# Patient Record
Sex: FEMALE | Race: WHITE | HISPANIC OR LATINO | Employment: OTHER | URBAN - METROPOLITAN AREA
[De-identification: names, ages, dates, MRNs, and addresses within clinical notes are randomized per-mention and may not be internally consistent; named-entity substitution may affect disease eponyms.]

---

## 2019-11-25 ENCOUNTER — TRANSCRIBE ORDERS (OUTPATIENT)
Dept: ADMINISTRATIVE | Facility: HOSPITAL | Age: 44
End: 2019-11-25

## 2019-11-25 DIAGNOSIS — R06.81 APNEA: ICD-10-CM

## 2019-11-25 DIAGNOSIS — R06.83 SNORING: Primary | ICD-10-CM

## 2019-11-27 ENCOUNTER — OFFICE VISIT (OUTPATIENT)
Dept: SLEEP CENTER | Facility: CLINIC | Age: 44
End: 2019-11-27
Payer: COMMERCIAL

## 2019-11-27 VITALS
BODY MASS INDEX: 34.6 KG/M2 | HEART RATE: 74 BPM | DIASTOLIC BLOOD PRESSURE: 50 MMHG | WEIGHT: 188 LBS | SYSTOLIC BLOOD PRESSURE: 114 MMHG | HEIGHT: 62 IN

## 2019-11-27 DIAGNOSIS — E66.9 OBESITY (BMI 30-39.9): ICD-10-CM

## 2019-11-27 DIAGNOSIS — R06.81 APNEA: ICD-10-CM

## 2019-11-27 DIAGNOSIS — E11.42 DIABETIC PERIPHERAL NEUROPATHY (HCC): ICD-10-CM

## 2019-11-27 DIAGNOSIS — R06.83 SNORING: Primary | ICD-10-CM

## 2019-11-27 DIAGNOSIS — R00.2 PALPITATIONS: ICD-10-CM

## 2019-11-27 DIAGNOSIS — Z72.0 TOBACCO ABUSE: ICD-10-CM

## 2019-11-27 DIAGNOSIS — G25.81 RESTLESS LEG SYNDROME: ICD-10-CM

## 2019-11-27 DIAGNOSIS — J31.0 CHRONIC RHINITIS: ICD-10-CM

## 2019-11-27 DIAGNOSIS — I10 ESSENTIAL HYPERTENSION: ICD-10-CM

## 2019-11-27 DIAGNOSIS — F41.9 ANXIETY: ICD-10-CM

## 2019-11-27 DIAGNOSIS — G47.09 OTHER INSOMNIA: ICD-10-CM

## 2019-11-27 DIAGNOSIS — G47.19 EXCESSIVE DAYTIME SLEEPINESS: ICD-10-CM

## 2019-11-27 DIAGNOSIS — F41.0 PANIC ATTACKS: ICD-10-CM

## 2019-11-27 PROCEDURE — 99204 OFFICE O/P NEW MOD 45 MIN: CPT | Performed by: INTERNAL MEDICINE

## 2019-11-27 RX ORDER — GABAPENTIN 400 MG/1
CAPSULE ORAL
COMMUNITY
Start: 2017-11-15

## 2019-11-27 RX ORDER — TRAZODONE HYDROCHLORIDE 50 MG/1
1 TABLET ORAL DAILY PRN
COMMUNITY

## 2019-11-27 RX ORDER — LISINOPRIL 5 MG/1
TABLET ORAL
COMMUNITY
Start: 2017-11-27

## 2019-11-27 RX ORDER — ROSUVASTATIN CALCIUM 20 MG/1
20 TABLET, COATED ORAL
COMMUNITY
Start: 2016-12-21

## 2019-11-27 RX ORDER — NITROGLYCERIN 0.4 MG/1
TABLET SUBLINGUAL
COMMUNITY
Start: 2018-03-20

## 2019-11-27 RX ORDER — AMLODIPINE BESYLATE 5 MG/1
5 TABLET ORAL
COMMUNITY
Start: 2019-02-25

## 2019-11-27 RX ORDER — CHOLECALCIFEROL (VITAMIN D3) 1250 MCG
50000 CAPSULE ORAL
COMMUNITY
Start: 2019-05-02

## 2019-11-27 RX ORDER — PAROXETINE HYDROCHLORIDE 20 MG/1
20 TABLET, FILM COATED ORAL
COMMUNITY
Start: 2019-05-28

## 2019-11-27 RX ORDER — LORATADINE 10 MG/1
10 TABLET ORAL
COMMUNITY
Start: 2017-06-01

## 2019-11-27 NOTE — PATIENT INSTRUCTIONS
What is CHRISTY? Obstructive sleep apnea is a common and serious sleep disorder that causes you to stop breathing during sleep  The airway repeatedly becomes blocked, limiting the amount of air that reaches your lungs  When this happens, you may snore loudly or making choking noises as you try to breathe  Your brain and body becomes oxygen deprived and you may wake up  This may happen a few times a night, or in more severe cases, several hundred times a night  Sleep apnea can make you wake up in the morning feeling tired or unrefreshed even though you have had a full night of sleep  During the day, you may feel fatigued, have difficulty concentrating or you may even unintentionally fall asleep  This is because your body is waking up numerous times throughout the night, even though you might not be conscious of each awakening  The lack of oxygen your body receives can have negative long-term consequences for your health  This includes:  High blood pressure  Heart disease  Irregular heart rhythms  Stroke  Pre-diabetes and diabetes  Depression    Testing  An objective evaluation of your sleep may be needed before your board certified sleep physician can make a diagnosis  Options include:   In-lab overnight sleep study  This type of sleep study requires you to stay overnight at a sleep center, in a bed that may resemble a hotel room  You will sleep with sensors hooked up to various parts of your body  These sensors record your brain waves, heartbeat, breathing and movement  An overnight sleep study also provides your doctor with the most complete information about your sleep  Learn more about an overnight sleep study      Home sleep apnea test  Some patients with high risk factors for obstructive sleep apnea and no other medical disorders may be candidates for a home sleep apnea test  The testing equipment differs in that it is less complicated than what is used in an overnight sleep study   As such, does not give all the data an in-lab will and if negative, may not mean you do not have the problem  Treatment for sleep apnea  includes using a continuous positive airway pressure (CPAP) machine to keep your airway open during sleep  A mask is placed over your nose and mouth, or just your nose  The mask is hooked to the CPAP machine that blows a gentle stream of air into the mask when you breathe  This helps keep your airway open so you can breathe more regularly  Extra oxygen may be given to you through the machine  You may be given a mouth device  It looks like a mouth guard or dental retainer and stops your tongue and mouth tissues from blocking your throat while you sleep  Surgery may be needed to remove extra tissues that block your mouth, throat, or nose  Manage sleep apnea:   Do not smoke  Nicotine and other chemicals in cigarettes and cigars can cause lung damage  Ask your healthcare provider for information if you currently smoke and need help to quit  E-cigarettes or smokeless tobacco still contain nicotine  Talk to your healthcare provider before you use these products  Do not drink alcohol or take sedative medicine before you go to sleep  Alcohol and sedatives can relax the muscles and tissues around your throat  This can block the airflow to your lungs  Maintain a healthy weight  Excess tissue around your throat may restrict your breathing  Ask your healthcare provider for information if you need to lose weight  Sleep on your side or use pillows designed to prevent sleep apnea  This prevents your tongue or other tissues from blocking your throat  You can also raise the head of your bed  Driving Safety  Refrain from driving when drowsy  Follow up with your healthcare provider as directed:  Write down your questions so you remember to ask them during your visits  Go to [employer] website for more information: Sleepeducation  org     What is CHRISTY?    Obstructive sleep apnea is a common and serious sleep disorder that causes you to stop breathing during sleep  The airway repeatedly becomes blocked, limiting the amount of air that reaches your lungs  When this happens, you may snore loudly or making choking noises as you try to breathe  Your brain and body becomes oxygen deprived and you may wake up  This may happen a few times a night, or in more severe cases, several hundred times a night  Sleep apnea can make you wake up in the morning feeling tired or unrefreshed even though you have had a full night of sleep  During the day, you may feel fatigued, have difficulty concentrating or you may even unintentionally fall asleep  This is because your body is waking up numerous times throughout the night, even though you might not be conscious of each awakening  The lack of oxygen your body receives can have negative long-term consequences for your health  This includes:  High blood pressure  Heart disease  Irregular heart rhythms  Stroke  Pre-diabetes and diabetes  Depression    Testing  An objective evaluation of your sleep may be needed before your board certified sleep physician can make a diagnosis  Options include:   In-lab overnight sleep study  This type of sleep study requires you to stay overnight at a sleep center, in a bed that may resemble a hotel room  You will sleep with sensors hooked up to various parts of your body  These sensors record your brain waves, heartbeat, breathing and movement  An overnight sleep study also provides your doctor with the most complete information about your sleep  Learn more about an overnight sleep study      Home sleep apnea test  Some patients with high risk factors for obstructive sleep apnea and no other medical disorders may be candidates for a home sleep apnea test  The testing equipment differs in that it is less complicated than what is used in an overnight sleep study   As such, does not give all the data an in-lab will and if negative, may not mean you do not have the problem  Treatment for sleep apnea  includes using a continuous positive airway pressure (CPAP) machine to keep your airway open during sleep  A mask is placed over your nose and mouth, or just your nose  The mask is hooked to the CPAP machine that blows a gentle stream of air into the mask when you breathe  This helps keep your airway open so you can breathe more regularly  Extra oxygen may be given to you through the machine  You may be given a mouth device  It looks like a mouth guard or dental retainer and stops your tongue and mouth tissues from blocking your throat while you sleep  Surgery may be needed to remove extra tissues that block your mouth, throat, or nose  Manage sleep apnea:   Do not smoke  Nicotine and other chemicals in cigarettes and cigars can cause lung damage  Ask your healthcare provider for information if you currently smoke and need help to quit  E-cigarettes or smokeless tobacco still contain nicotine  Talk to your healthcare provider before you use these products  Do not drink alcohol or take sedative medicine before you go to sleep  Alcohol and sedatives can relax the muscles and tissues around your throat  This can block the airflow to your lungs  Maintain a healthy weight  Excess tissue around your throat may restrict your breathing  Ask your healthcare provider for information if you need to lose weight  Sleep on your side or use pillows designed to prevent sleep apnea  This prevents your tongue or other tissues from blocking your throat  You can also raise the head of your bed  Driving Safety  Refrain from driving when drowsy  Follow up with your healthcare provider as directed:  Write down your questions so you remember to ask them during your visits  Go to AAS website for more information: Sleepeducation  org           Nursing Support:  When: Monday through Friday 7A-5PM except holidays  Where: Our direct line is [phone]      If you are having a true emergency please call 911  In the event that the line is busy or it is after hours please leave a voice message and we will return your call  Please speak clearly, leaving your full name, birth date, best number to reach you and the reason for your call  Medication refills: We will need the name of the medication, the dosage, the ordering provider, whether you get a 30 or 90 day refill, and the pharmacy name and address  Medications will be ordered by the provider only  Nurses cannot call in prescriptions  Please allow 7 days for medication refills  Physician requested updates: If your provider requested that you call with an update after starting medication, please be ready to provide us the medication and dosage, what time you take your medication, the time you attempt to fall asleep, time you fall asleep, when you wake up, and what time you get out of bed  Sleep Study Results: We will contact you with sleep study results and/or next steps after the physician has reviewed your testing

## 2019-11-27 NOTE — PROGRESS NOTES
Review of Systems      Genitourinary need to urinate more than twice a night and hot flashes at night   Cardiology palpitations/fluttering feeling in the chest and ankle/leg swelling   Gastrointestinal none   Neurology frequent headaches, awaken with headache, forgetfulness, poor concentration or confusion,  and difficulty with memory   Constitutional fatigue and weight change   Integumentary none   Psychiatry anxiety and depression   Musculoskeletal legs twitching/jerking   Pulmonary snoring and difficulty breathing when lying flat    ENT throat clearing   Endocrine none   Hematological none

## 2019-11-27 NOTE — PROGRESS NOTES
Consultation - Sleep Center   Destiny Cheung  53 y o  female  :1975  IFK:[id number]    Physician Requesting Consult: Jonathan Recinos DO     Reason for Consult : At your kind request I saw this patient for initial sleep evaluation today  The patient is here to evaluate for suspected Obstructive Sleep Apnea  PFSH, Problem List, Medications & Allergies were reviewed in EMR  She  has no past medical history on file  She has a current medication list which includes the following prescription(s): acetaminophen, amlodipine, vitamin d3, gabapentin, insulin aspart, insulin glargine, lisinopril, loratadine, nitroglycerin, paroxetine, patiromer sorbitex calcium, rosuvastatin, and trazodone  HPI:  She presents with a complaint of loud snoring of at least 2 months duration and family have witnessed apneas  On occasion, she has a woken herself with choking or gasping  Episodes may be associated with palpitations or excessive sweating  Other Complaints:  Fatigue irrespective of sleep  Restless Leg Syndrome: has typical symptoms occurring around once a week and can delay sleep  Symptoms are somewhat improved since she started gabapentin for peripheral neuropathy  Parasomnia: no features reported    Sleep Routine: Typical Bedtime: 9:30 p m  Gets OOB:  6:00 a m  TIB:8 5 hrs  Sleep latency:> 60 minutes but denied reason still or clock watching  She uses trazodone as a sleep aid  Sleep Interruptions:2-3/nite and is usually able to fall back asleep  Awakens: spontaneously  Upon awakening: never feels rested  She estimates getting 4-6 hrs sleep  She has Excessive Daytime Sleepiness struggles to stay awake and naps when she has the opportunity  [place] Sleepiness Scale rated at Total score: 15 /24  Habits: reports that she has been smoking  She uses smokeless tobacco  , reports that she drank alcohol  ,  reports that she does not use drugs  ,Caffeine use:limited , Exercise routine: none         Family History: Negative for sleep disturbance  ROS: reviewed & as attached  Significant for approximately 30 lb weight gain in the past 1 year  She has constant nasal congestion for which she has to use Afrin  She is on Paxil for anxiety  And panic attacks  EXAM:  /50   Pulse 74   Ht 5' 2" (1 575 m)   Wt 85 3 kg (188 lb)   BMI 34 39 kg/m²    General: Well groomed female, well appearing, in no apparent distress  Psychiatric: Alert and orientated; Cooperative; Speech:clear and coherent; Normal mood, affect & thought   HEENT:  Craniofacial anatomy: prognathia Sinuses: non- tender  TMJ: Normal    Eyes: EOM's intact;  conjunctiva/corneas clear   Ears: Externallyappear normal     Nasal Airway: partially obstructed Septum:intact; Mucosa:  Hyperemic; Turbinates:  Hypertrophy; Rhinorrhea: None   Mouth: Lips: normal posture; Dentition: normal   Mucosa:moist  ; Hard Palate:normal    Oropharryx: crowded and AP narrowing Tongue: Mallampati:Class II and MobileSoft Palate:  redundant  Tonsils: no hypertrophy  Neck:  Neck Circumference: 13 "; Supple; no abnormal masses; Thyroid:normal  Trachea:central     Lymph: No Cervical or Submandibular Lymhadenopathy  Heart: S1,S2 normal; RRR; no gallop; nomurmurs   Lungs: Respiratory Effort:normal  Air entry good bilaterally  No wheezes  No rales  Abdomen: Obese, Soft & non-tender    Extremities: No pedal edema  No clubbing or cyanosis  Skin: warm and dry; Color& Hydration good; no facial rashes or lesions   Neurological: CNII-XII intact; Motor normal; Sensation normal  Musculoskeletal: Muscle bulk, tone and power WNL Gait:normal        IMPRESSION: Primary, Secondary Sleep Diagnoses (to Medical or Psych conditions) & Comorbidities   1  Snoring  Ambulatory referral to Sleep Medicine    Split Study   2  Apnea  Ambulatory referral to Sleep Medicine    Split Study   3  Other insomnia     4  Restless leg syndrome     5  Excessive daytime sleepiness  Split Study   6   Essential hypertension     7  Chronic rhinitis     8  Tobacco abuse     9  Anxiety     10  Panic attacks     11  Palpitations     12  Diabetic peripheral neuropathy ([facility] [address] )     13  Obesity (BMI 30-39  9)        PLAN:   1  Comprehensive counseling was provided on pathophysiology, diagnostic strategies & treatment options; effects on symptoms and comorbidities; risks of inadequate therapy; costs and insurance aspects  2  I advised on weight reduction, avoiding sleeping supine, using alcohol or sedating medications close to bed time and on safe driving practices  3  Cognitive behavioral therapy was initiated with advise on Sleep Hygiene and behavioral techniques to manage Insomnia  4  Nocturnal polysomnography is indicated and a diagnostic study will be scheduled  5  Patient is willing to try Positive airway pressure therapy and will be scheduled for a titration study if indicated  6  Follow-up will be scheduled after the studies to review results, further details of treatment options and to initiate/adjust therapy  Thank you for allowing me to participate in the care of this patient  I will keep you apprised of developments      Sincerely,     Authenticated electronically by Chavez Bishop MD   on 28/27/08   Board Certified Specialist

## 2019-12-13 ENCOUNTER — HOSPITAL ENCOUNTER (OUTPATIENT)
Dept: SLEEP CENTER | Facility: CLINIC | Age: 44
Discharge: HOME | End: 2019-12-13
Payer: COMMERCIAL

## 2019-12-13 DIAGNOSIS — G47.33 OSA (OBSTRUCTIVE SLEEP APNEA): ICD-10-CM

## 2019-12-13 DIAGNOSIS — G47.19 EXCESSIVE DAYTIME SLEEPINESS: ICD-10-CM

## 2019-12-13 DIAGNOSIS — R06.81 APNEA: ICD-10-CM

## 2019-12-13 DIAGNOSIS — R06.83 SNORING: ICD-10-CM

## 2019-12-13 PROCEDURE — 95810 POLYSOM 6/> YRS 4/> PARAM: CPT

## 2019-12-14 ENCOUNTER — TRANSCRIBE ORDERS (OUTPATIENT)
Dept: SLEEP CENTER | Facility: CLINIC | Age: 44
End: 2019-12-14

## 2019-12-14 DIAGNOSIS — G47.33 OSA (OBSTRUCTIVE SLEEP APNEA): Primary | ICD-10-CM

## 2019-12-14 NOTE — PROGRESS NOTES
Sleep Study Documentation    Pre-Sleep Study       Sleep testing procedure explained to patient:YES    Patient napped prior to study:NO    Caffeine:Dayshift worker after 12PM   Caffeine use:NO    Alcohol:Dayshift workers after 5PM: Alcohol use:NO    Typical day for patient:YES       Study Documentation    Sleep Study Indications: Snore, witnessed apnea, Choking/Gasping during sleep, EDS, Unrefreshing sleep, Morning headaches    Sleep Study: Diagnostic   Snore:Mild  Supplemental O2: no    O2 flow rate (L/min) range NA  O2 flow rate (L/min) final NA  Minimum SaO2 82  Baseline SaO2 95        Mode of Therapy: Na    EKG abnormalities: no     EEG abnormalities: no    Sleep Study Recorded < 2 hours: N/A    Sleep Study Recorded > 2 hours but incomplete study: N/A    Sleep Study Recorded 6 hours but no sleep obtained: NO    Patient classification: disabled       Post-Sleep Study    Medication used at bedtime or during sleep study:NO  Patient forgot to take evening Meds    Patient reports time it took to fall asleep:greater than 60 minutes    Patient reports waking up during study:3 or more times  Patient reports returning to sleep in greater than 30 minutes  Patient reports sleeping 2 to 4 hours without dreaming  Patient reports sleep during study:worse than usual    Patient rated sleepiness: Very sleepy or tired    PAP treatment:no    Patient did not meet split night criteria due to prolonged sleep onset and lack of clinically significant AHI  Patient c/o discomfort from wires and anxiety

## 2019-12-16 DIAGNOSIS — G47.34 SLEEP RELATED HYPOXIA: Primary | ICD-10-CM

## 2019-12-17 ENCOUNTER — TELEPHONE (OUTPATIENT)
Dept: SLEEP CENTER | Facility: CLINIC | Age: 44
End: 2019-12-17

## 2019-12-17 NOTE — TELEPHONE ENCOUNTER
Discussed study results- scheduled for follow-up 1/10 in [facility]- address provided  Advised her Dr Nelly Wood is recommending that she see a pulmonologist  Oxygen order & sleep study faxed to [facility]

## 2020-01-10 ENCOUNTER — OFFICE VISIT (OUTPATIENT)
Dept: SLEEP CENTER | Facility: CLINIC | Age: 45
End: 2020-01-10
Payer: COMMERCIAL

## 2020-01-10 VITALS
DIASTOLIC BLOOD PRESSURE: 62 MMHG | BODY MASS INDEX: 33.31 KG/M2 | HEART RATE: 70 BPM | HEIGHT: 62 IN | WEIGHT: 181 LBS | SYSTOLIC BLOOD PRESSURE: 118 MMHG

## 2020-01-10 DIAGNOSIS — R06.83 SNORING: ICD-10-CM

## 2020-01-10 DIAGNOSIS — G47.34 SLEEP RELATED HYPOXIA: Primary | ICD-10-CM

## 2020-01-10 DIAGNOSIS — G25.81 RESTLESS LEG SYNDROME: ICD-10-CM

## 2020-01-10 DIAGNOSIS — G47.09 OTHER INSOMNIA: ICD-10-CM

## 2020-01-10 DIAGNOSIS — Z72.0 TOBACCO ABUSE: ICD-10-CM

## 2020-01-10 DIAGNOSIS — F41.9 ANXIETY: ICD-10-CM

## 2020-01-10 DIAGNOSIS — G47.19 EXCESSIVE DAYTIME SLEEPINESS: ICD-10-CM

## 2020-01-10 DIAGNOSIS — J31.0 CHRONIC RHINITIS: ICD-10-CM

## 2020-01-10 PROCEDURE — 99214 OFFICE O/P EST MOD 30 MIN: CPT | Performed by: INTERNAL MEDICINE

## 2020-01-10 NOTE — PROGRESS NOTES
Follow-up Note - Sleep Center   Sukhdeep Flynn  53 y o  female  :1975  AJV:[id number]    I saw Syed Huntley in sleep clinic today for her sleep complaints & comorbidities  Patient recently had a diagnostic sleep study and is here to review results / treatment options  The study demonstrated snoring with features of upper airway resistance, but no evidence for  obstructive sleep apnea: AHI 2 4 /hour and higher during stage REM  Frequent snoring of mild to moderate intensity was noted    Minimum oxygen saturation 82 %  and 87 7% of total sleep time was spent with saturations less than 90%  There was severe periodic limb movements of sleep for an index of 49 per hour causing arousals 3 times an hour  PFSH, Problem List, Medications & Allergies were reviewed in EMR  Interval changes: none reported  She  has no past medical history on file  She has a current medication list which includes the following prescription(s): acetaminophen, amlodipine, vitamin d3, gabapentin, insulin aspart, insulin glargine, liraglutide, lisinopril, loratadine, nitroglycerin, paroxetine, patiromer sorbitex calcium, rosuvastatin, and trazodone  ROS: reviewed see attached  HPI:  Supplemental oxygen was initiated and she feels sleeping longer since initiating use  She has restless leg symptoms and jerking movements that awaken her from sleep  Sleep Routine:  She estimates getting 6-8 hours sleep out of 8-10 hours in bed  She attributes to anxiety and has racing thoughts  She is on Paxil that has helped somewhat but felt she did better with sleep when on trazodone  She has excessive drowsiness and naps for 2-3 hours during the day  She rated herself at Total score: 15 /24 on the [place] sleepiness scale  Habits:  reports that she has been smoking  She uses smokeless tobacco  She reports that she drank alcohol  She reports that she does not use drugs  She has reduced her smoking    She is unable to exercise because of back and lower extremity pain  EXAM: /62   Pulse 70   Ht 5' 2" (1 575 m)   Wt 82 1 kg (181 lb)   BMI 33 11 kg/m²     Patient is well groomed; well appearing  Skin/Extrem: warm & dry; col & hydration normal; no edema  Psych: cooperativeand in no distress  Anxious  CNS: Alert, orientated, pressured speech  H&N: EOMI; NC/AT:no facial pressure marks, no rashes  Neck Circumference: 13 cm  ENMT Mucosa appearsnormal Nasal airway:patent  Oral airway:  crowded  Resp:effort is normal CVS: RRR ABD: truncal obesity MSK:Gait normal     IMPRESSION: Primary Sleep/Secondary(to Medical or Psych conditions) & comorbidities   1  Sleep related hypoxia  Pulse oximetry overnight   2  Snoring     3  Other insomnia     4  Restless leg syndrome     5  Excessive daytime sleepiness     6  Anxiety     7  Tobacco abuse     8  Chronic rhinitis         PLAN:    1  I reviewed results of the Sleep studies with the patient  2  With respect to above conditions, I counseled on pathophysiology, diagnosis, treatment options, risks and benefits; inter-relationship and effects on symptoms and comorbidities; risks of no treatment; costs and insurance aspects  3  She will continue with supplemental oxygen at 2 liters/minute during sleep  4  Cognitive behavioral therapy was initiated, Sleep Hygiene and behavioral techniques to manage Insomnia were discussed  Specifically avoiding daytime naps, limiting time in bed to 7-1/2 hours, avoiding watching TV in bed and on relaxation techniques  5  She may benefit from further adjustment of her Paxil or adding trazodone q h s  6  If symptoms persist in spite of the above strategies, a repeat diagnostic study once sleep has consolidated would be warranted  7  She was encouraged to persist with her efforts at smoking cessation and on weight reduction  Nocturnal pulse oximetry (on O2 via nasal cannula) to be undertaken prior to her next follow-up       8  Need for compliance with therapy and weight reduction were emphasized  9  Follow-up to be scheduled in 6 months to monitor compliance, progress and to adjust therapy  Thank you for allowing me to participate in the care of this patient  I will keep you apprised of developments      Sincerely,    Authenticated electronically by Jennifer Soto MD on 44/52/14   Board Certified Specialist

## 2020-01-10 NOTE — PATIENT INSTRUCTIONS

## 2020-01-10 NOTE — PROGRESS NOTES
Review of Systems      Genitourinary need to urinate more than twice a night   Cardiology palpitations/fluttering feeling in the chest and ankle/leg swelling   Gastrointestinal none   Neurology muscle weakness, forgetfulness, poor concentration or confusion,  and difficulty with memory   Constitutional fatigue   Integumentary none   Psychiatry anxiety, aggressiveness or irritability and mood change   Musculoskeletal back pain, legs twitching/jerking and sciatica   Pulmonary snoring   ENT throat clearing   Endocrine frequent urination   Hematological none

## 2020-01-13 ENCOUNTER — TELEPHONE (OUTPATIENT)
Dept: SLEEP CENTER | Facility: CLINIC | Age: 45
End: 2020-01-13

## 2020-07-18 ENCOUNTER — HOSPITAL ENCOUNTER (EMERGENCY)
Facility: HOSPITAL | Age: 45
Discharge: HOME | End: 2020-07-18
Attending: EMERGENCY MEDICINE | Admitting: EMERGENCY MEDICINE
Payer: COMMERCIAL

## 2020-07-18 ENCOUNTER — APPOINTMENT (EMERGENCY)
Dept: CT IMAGING | Facility: HOSPITAL | Age: 45
End: 2020-07-18
Payer: COMMERCIAL

## 2020-07-18 VITALS
SYSTOLIC BLOOD PRESSURE: 152 MMHG | HEART RATE: 94 BPM | WEIGHT: 172.62 LBS | OXYGEN SATURATION: 97 % | DIASTOLIC BLOOD PRESSURE: 70 MMHG | TEMPERATURE: 98.6 F | RESPIRATION RATE: 18 BRPM | BODY MASS INDEX: 31.57 KG/M2

## 2020-07-18 DIAGNOSIS — R10.9 ABDOMINAL CRAMPS: ICD-10-CM

## 2020-07-18 DIAGNOSIS — R19.7 DIARRHEA: Primary | ICD-10-CM

## 2020-07-18 LAB
ALBUMIN SERPL BCP-MCNC: 3.1 G/DL (ref 3.5–5)
ALP SERPL-CCNC: 155 U/L (ref 46–116)
ALT SERPL W P-5'-P-CCNC: 17 U/L (ref 12–78)
ANION GAP SERPL CALCULATED.3IONS-SCNC: 8 MMOL/L (ref 4–13)
APTT PPP: 31 SECONDS (ref 23–37)
AST SERPL W P-5'-P-CCNC: 18 U/L (ref 5–45)
BASOPHILS # BLD AUTO: 0.06 THOUSANDS/ΜL (ref 0–0.1)
BASOPHILS NFR BLD AUTO: 1 % (ref 0–1)
BILIRUB SERPL-MCNC: 0.18 MG/DL (ref 0.2–1)
BUN SERPL-MCNC: 31 MG/DL (ref 5–25)
CALCIUM SERPL-MCNC: 8.5 MG/DL (ref 8.3–10.1)
CHLORIDE SERPL-SCNC: 102 MMOL/L (ref 100–108)
CO2 SERPL-SCNC: 25 MMOL/L (ref 21–32)
CREAT SERPL-MCNC: 1.89 MG/DL (ref 0.6–1.3)
EOSINOPHIL # BLD AUTO: 0.23 THOUSAND/ΜL (ref 0–0.61)
EOSINOPHIL NFR BLD AUTO: 2 % (ref 0–6)
ERYTHROCYTE [DISTWIDTH] IN BLOOD BY AUTOMATED COUNT: 12.4 % (ref 11.6–15.1)
GFR SERPL CREATININE-BSD FRML MDRD: 31 ML/MIN/1.73SQ M
GLUCOSE SERPL-MCNC: 149 MG/DL (ref 65–140)
HCT VFR BLD AUTO: 33.4 % (ref 34.8–46.1)
HGB BLD-MCNC: 11.1 G/DL (ref 11.5–15.4)
IMM GRANULOCYTES # BLD AUTO: 0.06 THOUSAND/UL (ref 0–0.2)
IMM GRANULOCYTES NFR BLD AUTO: 1 % (ref 0–2)
INR PPP: 1.04 (ref 0.84–1.19)
LIPASE SERPL-CCNC: 456 U/L (ref 73–393)
LYMPHOCYTES # BLD AUTO: 2.77 THOUSANDS/ΜL (ref 0.6–4.47)
LYMPHOCYTES NFR BLD AUTO: 25 % (ref 14–44)
MCH RBC QN AUTO: 29.8 PG (ref 26.8–34.3)
MCHC RBC AUTO-ENTMCNC: 33.2 G/DL (ref 31.4–37.4)
MCV RBC AUTO: 90 FL (ref 82–98)
MONOCYTES # BLD AUTO: 0.53 THOUSAND/ΜL (ref 0.17–1.22)
MONOCYTES NFR BLD AUTO: 5 % (ref 4–12)
NEUTROPHILS # BLD AUTO: 7.48 THOUSANDS/ΜL (ref 1.85–7.62)
NEUTS SEG NFR BLD AUTO: 66 % (ref 43–75)
NRBC BLD AUTO-RTO: 0 /100 WBCS
PLATELET # BLD AUTO: 381 THOUSANDS/UL (ref 149–390)
PMV BLD AUTO: 9.5 FL (ref 8.9–12.7)
POTASSIUM SERPL-SCNC: 4.7 MMOL/L (ref 3.5–5.3)
PROT SERPL-MCNC: 6.5 G/DL (ref 6.4–8.2)
PROTHROMBIN TIME: 13 SECONDS (ref 11.6–14.5)
RBC # BLD AUTO: 3.73 MILLION/UL (ref 3.81–5.12)
SODIUM SERPL-SCNC: 135 MMOL/L (ref 136–145)
TROPONIN I SERPL-MCNC: 0.02 NG/ML
WBC # BLD AUTO: 11.13 THOUSAND/UL (ref 4.31–10.16)

## 2020-07-18 PROCEDURE — 83690 ASSAY OF LIPASE: CPT | Performed by: EMERGENCY MEDICINE

## 2020-07-18 PROCEDURE — 80053 COMPREHEN METABOLIC PANEL: CPT | Performed by: EMERGENCY MEDICINE

## 2020-07-18 PROCEDURE — 84484 ASSAY OF TROPONIN QUANT: CPT | Performed by: EMERGENCY MEDICINE

## 2020-07-18 PROCEDURE — 96360 HYDRATION IV INFUSION INIT: CPT

## 2020-07-18 PROCEDURE — 85730 THROMBOPLASTIN TIME PARTIAL: CPT | Performed by: EMERGENCY MEDICINE

## 2020-07-18 PROCEDURE — 85025 COMPLETE CBC W/AUTO DIFF WBC: CPT | Performed by: EMERGENCY MEDICINE

## 2020-07-18 PROCEDURE — 85610 PROTHROMBIN TIME: CPT | Performed by: EMERGENCY MEDICINE

## 2020-07-18 PROCEDURE — 96361 HYDRATE IV INFUSION ADD-ON: CPT

## 2020-07-18 PROCEDURE — 99284 EMERGENCY DEPT VISIT MOD MDM: CPT | Performed by: EMERGENCY MEDICINE

## 2020-07-18 PROCEDURE — 74177 CT ABD & PELVIS W/CONTRAST: CPT

## 2020-07-18 PROCEDURE — 93005 ELECTROCARDIOGRAM TRACING: CPT

## 2020-07-18 PROCEDURE — 99285 EMERGENCY DEPT VISIT HI MDM: CPT

## 2020-07-18 PROCEDURE — 36415 COLL VENOUS BLD VENIPUNCTURE: CPT | Performed by: EMERGENCY MEDICINE

## 2020-07-18 RX ORDER — DICYCLOMINE HCL 20 MG
20 TABLET ORAL ONCE
Status: COMPLETED | OUTPATIENT
Start: 2020-07-18 | End: 2020-07-18

## 2020-07-18 RX ORDER — HYDROCODONE BITARTRATE AND ACETAMINOPHEN 5; 325 MG/1; MG/1
1 TABLET ORAL ONCE
Status: DISCONTINUED | OUTPATIENT
Start: 2020-07-18 | End: 2020-07-18 | Stop reason: HOSPADM

## 2020-07-18 RX ORDER — OXYCODONE HYDROCHLORIDE AND ACETAMINOPHEN 5; 325 MG/1; MG/1
1 TABLET ORAL EVERY 6 HOURS PRN
Qty: 10 TABLET | Refills: 0 | Status: SHIPPED | OUTPATIENT
Start: 2020-07-18

## 2020-07-18 RX ORDER — DICYCLOMINE HCL 20 MG
20 TABLET ORAL EVERY 6 HOURS PRN
Qty: 20 TABLET | Refills: 0 | Status: SHIPPED | OUTPATIENT
Start: 2020-07-18

## 2020-07-18 RX ORDER — ONDANSETRON 2 MG/ML
1 INJECTION INTRAMUSCULAR; INTRAVENOUS ONCE
Status: DISCONTINUED | OUTPATIENT
Start: 2020-07-18 | End: 2020-07-18 | Stop reason: HOSPADM

## 2020-07-18 RX ORDER — HYDROCODONE BITARTRATE AND ACETAMINOPHEN 5; 325 MG/1; MG/1
1 TABLET ORAL EVERY 6 HOURS PRN
Qty: 10 TABLET | Refills: 0 | Status: SHIPPED | OUTPATIENT
Start: 2020-07-18 | End: 2020-07-18

## 2020-07-18 RX ADMIN — SODIUM CHLORIDE 1000 ML: 0.9 INJECTION, SOLUTION INTRAVENOUS at 16:40

## 2020-07-18 RX ADMIN — IOHEXOL 100 ML: 350 INJECTION, SOLUTION INTRAVENOUS at 17:42

## 2020-07-18 RX ADMIN — DICYCLOMINE HYDROCHLORIDE 20 MG: 20 TABLET ORAL at 16:37

## 2020-07-18 NOTE — ED PROVIDER NOTES
History  Chief Complaint   Patient presents with    Abdominal Pain     Patient reports worsening abomdinal pain since last night  Has had diarrhea for "years " Vomiting also present  Given zofran en route  History provided by:  Patient   used: No    Abdominal Pain   Associated symptoms: chest pain, diarrhea, nausea and vomiting    Associated symptoms: no dysuria and no shortness of breath    75-year-old female with history of diabetes, IBS presented for evaluation of about 3-4 weeks worsening diarrhea, now with some associated blood in the stool and with worsening constant diffuse abdominal pain since yesterday  States prior to yesterday she had minimal pain  She also had a few episodes of vomiting this morning  No blood  No fevers, chills  Denies any travel, sick contacts, antibiotics, change in diet  She avoids lactose chronically  Given Zofran by EMS  Not currently nauseous  History of cholecystectomy and hysterectomy  She also reported episode of moderate chest discomfort prior to arrival but that has since resolved  Appears uncomfortable  Abdomen soft and diffusely tender to very light touch  No guarding  Differential diagnosis includes colitis, diverticulitis, bowel perforation, abscess, appendicitis, viral illness  Plan EKG, labs, abdominal imaging, fluids and symptomatic control  Prior to Admission Medications   Prescriptions Last Dose Informant Patient Reported? Taking?    Acetaminophen 325 MG CAPS   Yes Yes   Sig: Take by mouth   Cholecalciferol (VITAMIN D3) 1 25 MG (44259 UT) CAPS   Yes Yes   Sig: Take 50,000 Units by mouth   PARoxetine (PAXIL) 20 mg tablet   Yes Yes   Sig: Take 20 mg by mouth   Patiromer Sorbitex Calcium 8 4 g PACK   Yes Yes   Sig: Pt is unsure of dose   amLODIPine (NORVASC) 5 mg tablet   Yes Yes   Sig: Take 5 mg by mouth   gabapentin (NEURONTIN) 400 mg capsule   Yes Yes   Sig: gabapentin 400 mg capsule   insulin aspart (NOVOLOG FLEXPEN) 100 Units/mL injection pen Not Taking at Unknown time  Yes No   Sig: Novolog Flexpen U-100 Insulin aspart 100 unit/mL (3 mL) subcutaneous   insulin glargine (LANTUS SOLOSTAR) 100 units/mL injection pen   Yes Yes   Sig: Inject 10 Units under the skin daily    liraglutide (VICTOZA) injection   Yes Yes   Sig: Inject 0 6-1 8 mg under the skin   lisinopril (ZESTRIL) 5 mg tablet   Yes Yes   Sig: lisinopril 5 mg tablet   loratadine (CLARITIN) 10 mg tablet   Yes Yes   Sig: Take 10 mg by mouth   nitroglycerin (NITROSTAT) 0 4 mg SL tablet   Yes Yes   Sig: PLACE 1 TABLET UNDER THE TONGUE EVERY 5 MINUTES FOR UP TO 3 DOSES AS NEEDED CALL 911 IF PAIN PERSIST   rosuvastatin (CRESTOR) 20 MG tablet More than a month at Unknown time  Yes No   Sig: Take 20 mg by mouth   traZODone (DESYREL) 50 mg tablet   Yes Yes   Si tablet daily as needed      Facility-Administered Medications: None       Past Medical History:   Diagnosis Date    IBS (irritable bowel syndrome)     Renal disorder     ESR stage III       Past Surgical History:   Procedure Laterality Date    CHOLECYSTECTOMY      HYSTERECTOMY         History reviewed  No pertinent family history  I have reviewed and agree with the history as documented  E-Cigarette/Vaping     E-Cigarette/Vaping Substances     Social History     Tobacco Use    Smoking status: Current Every Day Smoker    Smokeless tobacco: Current User   Substance Use Topics    Alcohol use: Not Currently     Frequency: Never    Drug use: Never       Review of Systems   Constitutional: Positive for appetite change  Negative for activity change  Respiratory: Negative for chest tightness and shortness of breath  Cardiovascular: Positive for chest pain  Gastrointestinal: Positive for abdominal pain, blood in stool, diarrhea, nausea and vomiting  Genitourinary: Negative for difficulty urinating and dysuria  Musculoskeletal: Negative for back pain and neck pain     Neurological: Negative for dizziness, weakness and headaches  All other systems reviewed and are negative  Physical Exam  Physical Exam   Constitutional: She is oriented to person, place, and time  She appears well-developed and well-nourished  She does not appear ill  Cardiovascular: Normal rate and regular rhythm  Pulmonary/Chest: Effort normal and breath sounds normal    Abdominal: Soft  Normal appearance  Diffuse tenderness to light touch without guarding  Neurological: She is alert and oriented to person, place, and time  Skin: Skin is warm and dry  Psychiatric: She has a normal mood and affect  Her behavior is normal    Nursing note and vitals reviewed        Vital Signs  ED Triage Vitals [07/18/20 1556]   Temperature Pulse Respirations Blood Pressure SpO2   98 6 °F (37 °C) 89 18 145/64 99 %      Temp Source Heart Rate Source Patient Position - Orthostatic VS BP Location FiO2 (%)   Oral Monitor Lying Right arm --      Pain Score       Worst Possible Pain           Vitals:    07/18/20 1556 07/18/20 1837 07/18/20 1839   BP: 145/64  152/70   Pulse: 89 94    Patient Position - Orthostatic VS: Lying Sitting          Visual Acuity      ED Medications  Medications   ondansetron (FOR EMS ONLY) (ZOFRAN) 4 mg/2 mL injection 4 mg (has no administration in time range)   HYDROcodone-acetaminophen (NORCO) 5-325 mg per tablet 1 tablet (has no administration in time range)   sodium chloride 0 9 % bolus 1,000 mL (0 mL Intravenous Stopped 7/18/20 1902)   dicyclomine (BENTYL) tablet 20 mg (20 mg Oral Given 7/18/20 1637)   iohexol (OMNIPAQUE) 350 MG/ML injection (MULTI-DOSE) 100 mL (100 mL Intravenous Given 7/18/20 1742)       Diagnostic Studies  Results Reviewed     Procedure Component Value Units Date/Time    Troponin I [[id number]]  (Normal) Collected:  07/18/20 1637    Lab Status:  Final result Specimen:  Blood from Arm, Left Updated:  07/18/20 1719     Troponin I 0 02 ng/mL     Comprehensive metabolic panel [[id number]]  (Abnormal) Collected: 07/18/20 1637    Lab Status:  Final result Specimen:  Blood from Arm, Left Updated:  07/18/20 1717     Sodium 135 mmol/L      Potassium 4 7 mmol/L      Chloride 102 mmol/L      CO2 25 mmol/L      ANION GAP 8 mmol/L      BUN 31 mg/dL      Creatinine 1 89 mg/dL      Glucose 149 mg/dL      Calcium 8 5 mg/dL      AST 18 U/L      ALT 17 U/L      Alkaline Phosphatase 155 U/L      Total Protein 6 5 g/dL      Albumin 3 1 g/dL      Total Bilirubin 0 18 mg/dL      eGFR 31 ml/min/1 73sq m     Narrative:       [place] guidelines for Chronic Kidney Disease (CKD):     Stage 1 with normal or high GFR (GFR > 90 mL/min/1 73 square meters)    Stage 2 Mild CKD (GFR = 60-89 mL/min/1 73 square meters)    Stage 3A Moderate CKD (GFR = 45-59 mL/min/1 73 square meters)    Stage 3B Moderate CKD (GFR = 30-44 mL/min/1 73 square meters)    Stage 4 Severe CKD (GFR = 15-29 mL/min/1 73 square meters)    Stage 5 End Stage CKD (GFR <15 mL/min/1 73 square meters)  Note: GFR calculation is accurate only with a steady state creatinine    Lipase [[id number]]  (Abnormal) Collected:  07/18/20 1637    Lab Status:  Final result Specimen:  Blood from Arm, Left Updated:  07/18/20 1717     Lipase 456 u/L     Protime-INR [[id number]]  (Normal) Collected:  07/18/20 1637    Lab Status:  Final result Specimen:  Blood from Arm, Left Updated:  07/18/20 1709     Protime 13 0 seconds      INR 1 04    APTT [[id number]]  (Normal) Collected:  07/18/20 1637    Lab Status:  Final result Specimen:  Blood from Arm, Left Updated:  07/18/20 1709     PTT 31 seconds     Clostridium difficile toxin by PCR with EIA [[id number]]     Lab Status:  No result Specimen:  Stool     Stool Enteric Bacterial Panel by PCR [[id number]]     Lab Status:  No result Specimen:  Stool     CBC and differential [[id number]]  (Abnormal) Collected:  07/18/20 1637    Lab Status:  Final result Specimen:  Blood from Arm, Left Updated:  07/18/20 1656     WBC 11 13 Thousand/uL RBC 3 73 Million/uL      Hemoglobin 11 1 g/dL      Hematocrit 33 4 %      MCV 90 fL      MCH 29 8 pg      MCHC 33 2 g/dL      RDW 12 4 %      MPV 9 5 fL      Platelets 138 Thousands/uL      nRBC 0 /100 WBCs      Neutrophils Relative 66 %      Immat GRANS % 1 %      Lymphocytes Relative 25 %      Monocytes Relative 5 %      Eosinophils Relative 2 %      Basophils Relative 1 %      Neutrophils Absolute 7 48 Thousands/µL      Immature Grans Absolute 0 06 Thousand/uL      Lymphocytes Absolute 2 77 Thousands/µL      Monocytes Absolute 0 53 Thousand/µL      Eosinophils Absolute 0 23 Thousand/µL      Basophils Absolute 0 06 Thousands/µL                  CT abdomen pelvis with contrast   Final Result by Fox Slaughter DO (07/18 1835)   No CT explanation for patient's symptoms  Incidental note is made of slight dilatation of the extrahepatic bile ducts, considered normal status post cholecystectomy  There is also a 1 4 cm relatively low-attenuation left adrenal nodule which could not be further evaluated in the presence of IV contrast enhancement  Consider adrenal mass protocol CT  Colonic diverticulosis without CT evidence of colitis or diverticulitis  Prominent but not pathologically enlarged retroperitoneal nodes              Workstation performed: [id number]                    Procedures  ECG 12 Lead Documentation Only  Date/Time: 7/18/2020 5:02 PM  Performed by: Darrell Jaramillo MD  Authorized by: Darrell Jaramillo MD     Indications / Diagnosis:  Chest pain  Patient location:  ED  Previous ECG:     Previous ECG:  Unavailable  Rate:     ECG rate:  86  Rhythm:     Rhythm: sinus rhythm    Ectopy:     Ectopy: none    QRS:     QRS axis:  Normal  Conduction:     Conduction: normal    ST segments:     ST segments:  Normal  T waves:     T waves: normal               ED Course                                             MDM  Number of Diagnoses or Management Options  Abdominal cramps: new and requires workup  Diarrhea: new and requires workup  Diagnosis management comments: 78-year-old female presented for evaluation of 3-4 weeks of watery diarrhea, abdominal cramps  Her lab work notable for slight increased lipase  She has no epigastric discomfort  History of CKD  Creatinine at baseline  CT unremarkable  Patient unable to provide stool sample here  Written for C diff and enteric bacteria culture  Plan GI follow-up  Amount and/or Complexity of Data Reviewed  Clinical lab tests: ordered and reviewed  Tests in the radiology section of CPT®: ordered and reviewed    Patient Progress  Patient progress: stable        Disposition  Final diagnoses:   Diarrhea   Abdominal cramps     Time reflects when diagnosis was documented in both MDM as applicable and the Disposition within this note     Time User Action Codes Description Comment    7/18/2020  6:52 PM Darwin ARGUELLES Add [R19 7] Diarrhea     7/18/2020  6:52 PM Ruben Noriega Add [R10 9] Abdominal cramps       ED Disposition     ED Disposition Condition Date/Time Comment    Discharge Stable Sat Jul 18, 2020  6:52 PM Raul Rojas discharge to home/self care              Follow-up Information     Follow up With Specialties Details Why Contact Info Additional [facility]   [address] [facility] [address] [facility], [address] [id number] [facility] [place], [address] [place] [address] [place], [place], [address] [phone]          Patient's Medications   Discharge Prescriptions    DICYCLOMINE (BENTYL) 20 MG TABLET    Take 1 tablet (20 mg total) by mouth every 6 (six) hours as needed (diarrhea/cramps)       Start Date: 7/18/2020 End Date: --       Order Dose: 20 mg       Quantity: 20 tablet    Refills: 0    HYDROCODONE-ACETAMINOPHEN (NORCO) 5-325 MG PER TABLET    Take 1 tablet by mouth every 6 (six) hours as needed for painMax Daily Amount: 4 tablets       Start Date: 7/18/2020 End Date: --       Order Dose: 1 tablet       Quantity: 10 tablet    Refills: 0     Outpatient Discharge Orders   Stool Enteric Bacterial Panel by PCR   Standing Status: Future Standing Exp  Date: 07/18/21     Clostridium difficile toxin by PCR with EIA   Standing Status: Future Standing Exp   Date: 07/18/21       PDMP Review       Value Time User    PDMP Reviewed  Yes 7/18/2020  6:52 PM Norma Wood MD          ED Provider  Electronically Signed by           Norma Wood MD  07/18/20 8280

## 2020-07-19 LAB
ATRIAL RATE: 86 BPM
P AXIS: 68 DEGREES
PR INTERVAL: 146 MS
QRS AXIS: 57 DEGREES
QRSD INTERVAL: 74 MS
QT INTERVAL: 366 MS
QTC INTERVAL: 437 MS
T WAVE AXIS: 82 DEGREES
VENTRICULAR RATE: 86 BPM

## 2020-07-19 PROCEDURE — 93010 ELECTROCARDIOGRAM REPORT: CPT | Performed by: INTERNAL MEDICINE

## 2023-08-11 ENCOUNTER — HOSPITAL ENCOUNTER (EMERGENCY)
Facility: HOSPITAL | Age: 48
Discharge: HOME | End: 2023-08-11
Attending: EMERGENCY MEDICINE | Admitting: EMERGENCY MEDICINE
Payer: COMMERCIAL

## 2023-08-11 VITALS
HEART RATE: 83 BPM | TEMPERATURE: 98.1 F | RESPIRATION RATE: 20 BRPM | OXYGEN SATURATION: 99 % | SYSTOLIC BLOOD PRESSURE: 140 MMHG | DIASTOLIC BLOOD PRESSURE: 67 MMHG

## 2023-08-11 DIAGNOSIS — S91.209A NAIL AVULSION OF TOE, INITIAL ENCOUNTER: Primary | ICD-10-CM

## 2023-08-11 PROCEDURE — NC001 PR NO CHARGE: Performed by: EMERGENCY MEDICINE

## 2023-08-11 PROCEDURE — 99284 EMERGENCY DEPT VISIT MOD MDM: CPT | Performed by: EMERGENCY MEDICINE

## 2023-08-11 PROCEDURE — 99282 EMERGENCY DEPT VISIT SF MDM: CPT

## 2023-08-11 RX ORDER — KETOROLAC TROMETHAMINE 30 MG/ML
15 INJECTION, SOLUTION INTRAMUSCULAR; INTRAVENOUS ONCE
Status: DISCONTINUED | OUTPATIENT
Start: 2023-08-11 | End: 2023-08-11

## 2023-08-11 RX ORDER — IBUPROFEN 200 MG
400 TABLET ORAL EVERY 8 HOURS PRN
Qty: 20 TABLET | Refills: 0 | Status: SHIPPED | OUTPATIENT
Start: 2023-08-11

## 2023-08-11 NOTE — ED PROVIDER NOTES
History  Chief Complaint   Patient presents with   • Toe Injury     Pt reports stubbed L pinky toe today. Hx diabetes. No bleeding/swelling noted. Nail intact. Corky Hickman is a 46 y.o. female who presents with the chief complaint of pain in her small toe on the left foot. She reports she tried to kick the wall today but missed and stubbed her toe against a piece of furniture. She reports the lateral aspect of her toenail avulsed but she was able to put it back down. She denies any pain in the bony part of the toe, only at the nail bed site. It did bleed initially but has since stopped. She has a history of ckd, iddm and is an active smoker. History provided by:  Patient   used: No    Toe Pain  Location:  Left small toe   Quality:  Toenail avulsion  Severity:  Mild  Onset quality:  Sudden  Duration:  2 hours  Timing:  Constant  Progression:  Unchanged  Chronicity:  New  Relieved by:  Rest  Worsened by: Walking, movement  Ineffective treatments:  None tried      Prior to Admission Medications   Prescriptions Last Dose Informant Patient Reported? Taking?    Acetaminophen 325 MG CAPS   Yes No   Sig: Take by mouth   Cholecalciferol (VITAMIN D3) 1.25 MG (15088 UT) CAPS   Yes No   Sig: Take 50,000 Units by mouth   PARoxetine (PAXIL) 20 mg tablet   Yes No   Sig: Take 20 mg by mouth   Patiromer Sorbitex Calcium 8.4 g PACK   Yes No   Sig: Pt is unsure of dose   amLODIPine (NORVASC) 5 mg tablet   Yes No   Sig: Take 5 mg by mouth   dicyclomine (BENTYL) 20 mg tablet   No No   Sig: Take 1 tablet (20 mg total) by mouth every 6 (six) hours as needed (diarrhea/cramps)   gabapentin (NEURONTIN) 400 mg capsule   Yes No   Sig: gabapentin 400 mg capsule   insulin aspart (NOVOLOG FLEXPEN) 100 Units/mL injection pen   Yes No   Sig: Novolog Flexpen U-100 Insulin aspart 100 unit/mL (3 mL) subcutaneous   insulin glargine (LANTUS SOLOSTAR) 100 units/mL injection pen   Yes No   Sig: Inject 10 Units under the skin daily    liraglutide (VICTOZA) injection   Yes No   Sig: Inject 0.6-1.8 mg under the skin   lisinopril (ZESTRIL) 5 mg tablet   Yes No   Sig: lisinopril 5 mg tablet   loratadine (CLARITIN) 10 mg tablet   Yes No   Sig: Take 10 mg by mouth   nitroglycerin (NITROSTAT) 0.4 mg SL tablet   Yes No   Sig: PLACE 1 TABLET UNDER THE TONGUE EVERY 5 MINUTES FOR UP TO 3 DOSES AS NEEDED CALL 911 IF PAIN PERSIST   oxyCODONE-acetaminophen (PERCOCET) 5-325 mg per tablet   No No   Sig: Take 1 tablet by mouth every 6 (six) hours as needed for moderate painMax Daily Amount: 4 tablets   rosuvastatin (CRESTOR) 20 MG tablet   Yes No   Sig: Take 20 mg by mouth   traZODone (DESYREL) 50 mg tablet   Yes No   Si tablet daily as needed      Facility-Administered Medications: None       Past Medical History:   Diagnosis Date   • IBS (irritable bowel syndrome)    • Renal disorder     ESR stage III       Past Surgical History:   Procedure Laterality Date   • CHOLECYSTECTOMY     • HYSTERECTOMY     • US GUIDED KIDNEY BIOPSY  2020       History reviewed. No pertinent family history. I have reviewed and agree with the history as documented. E-Cigarette/Vaping     E-Cigarette/Vaping Substances     Social History     Tobacco Use   • Smoking status: Every Day   • Smokeless tobacco: Current   Substance Use Topics   • Alcohol use: Not Currently   • Drug use: Never       Review of Systems    Physical Exam  Physical Exam  Constitutional:       General: She is in acute distress (mild).    Musculoskeletal:        Feet:          Vital Signs  ED Triage Vitals [23 1644]   Temperature Pulse Respirations Blood Pressure SpO2   98.1 °F (36.7 °C) 83 20 140/67 99 %      Temp Source Heart Rate Source Patient Position - Orthostatic VS BP Location FiO2 (%)   Oral Monitor Sitting Right arm --      Pain Score       10 - Worst Possible Pain           Vitals:    23 1644   BP: 140/67   Pulse: 83   Patient Position - Orthostatic VS: Sitting         Visual Acuity      ED Medications  Medications - No data to display    Diagnostic Studies  Results Reviewed     None                 No orders to display              Procedures  Procedures         ED Course                                             Medical Decision Making  Toe pain after stubbing toe, patient declines xray, states no bone pain, only pain at avulsed but replaced nail. Will treat pain with anti-inflammatory, will avoid narcotics, will buddy tape toe (avoid toenail) and provide ortho shoe. Risk  OTC drugs. Disposition  Final diagnoses:   Nail avulsion of toe, initial encounter - acute left small toe     Time reflects when diagnosis was documented in both MDM as applicable and the Disposition within this note     Time User Action Codes Description Comment    8/11/2023  5:38 PM Abelardo JUNIOR Add [S91.209A] Nail avulsion of toe, initial encounter     8/11/2023  5:38 PM Kassy Mahoney Modify [S91.209A] Nail avulsion of toe, initial encounter acute left small toe      ED Disposition     ED Disposition   Discharge    Condition   Stable    Date/Time   Fri Aug 11, 2023  5:38 PM    Comment   Usman Wilson discharge to home/self care.                Follow-up Information     Follow up With Specialties Details Why Contact Info Additional Information    [address] [facility] Schedule an appointment as soon as possible for a visit in 3 days  [address] [place] [id number] [phone] [address] [place] [address] ([place]), [address] [facility]  [phone]          Discharge Medication List as of 8/11/2023  5:39 PM      START taking these medications    Details   ibuprofen (MOTRIN) 200 mg tablet Take 2 tablets (400 mg total) by mouth every 8 (eight) hours as needed for mild pain, Starting Fri 8/11/2023, Normal         CONTINUE these medications which have NOT CHANGED    Details   Acetaminophen 325 MG CAPS Take by mouth, Historical Med      amLODIPine (NORVASC) 5 mg tablet Take 5 mg by mouth, Starting Mon 2/25/2019, Historical Med      Cholecalciferol (VITAMIN D3) 1.25 MG (23591 UT) CAPS Take 50,000 Units by mouth, Starting Thu 5/2/2019, Historical Med      dicyclomine (BENTYL) 20 mg tablet Take 1 tablet (20 mg total) by mouth every 6 (six) hours as needed (diarrhea/cramps), Starting Sat 7/18/2020, Normal      gabapentin (NEURONTIN) 400 mg capsule gabapentin 400 mg capsule, Historical Med      insulin aspart (NOVOLOG FLEXPEN) 100 Units/mL injection pen Novolog Flexpen U-100 Insulin aspart 100 unit/mL (3 mL) subcutaneous, Historical Med      insulin glargine (LANTUS SOLOSTAR) 100 units/mL injection pen Inject 10 Units under the skin daily , Starting Sat 10/21/2017, Historical Med      liraglutide (VICTOZA) injection Inject 0.6-1.8 mg under the skin, Starting Tue 1/7/2020, Historical Med      lisinopril (ZESTRIL) 5 mg tablet lisinopril 5 mg tablet, Historical Med      loratadine (CLARITIN) 10 mg tablet Take 10 mg by mouth, Starting Thu 6/1/2017, Historical Med      nitroglycerin (NITROSTAT) 0.4 mg SL tablet PLACE 1 TABLET UNDER THE TONGUE EVERY 5 MINUTES FOR UP TO 3 DOSES AS NEEDED CALL 911 IF PAIN PERSIST, Historical Med      oxyCODONE-acetaminophen (PERCOCET) 5-325 mg per tablet Take 1 tablet by mouth every 6 (six) hours as needed for moderate painMax Daily Amount: 4 tablets, Starting Sat 7/18/2020, Normal      PARoxetine (PAXIL) 20 mg tablet Take 20 mg by mouth, Starting Tue 5/28/2019, Historical Med      Patiromer Sorbitex Calcium 8.4 g PACK Pt is unsure of dose, Historical Med      rosuvastatin (CRESTOR) 20 MG tablet Take 20 mg by mouth, Starting Wed 12/21/2016, Historical Med      traZODone (DESYREL) 50 mg tablet 1 tablet daily as needed, Historical Med             No discharge procedures on file.     PDMP Review       Value Time User    PDMP Reviewed  Yes 7/18/2020  6:52 PM Kathia Shelton MD          ED Provider  Electronically Signed by           Johan Clark Nilson King MD  08/11/23 9978

## 2023-08-11 NOTE — MEDICAL STUDENT
History     Chief Complaint   Patient presents with   • Toe Injury     Pt reports stubbed L pinky toe today. Hx diabetes. No bleeding/swelling noted. Nail intact. HPI    Past Medical History:   Diagnosis Date   • IBS (irritable bowel syndrome)    • Renal disorder     ESR stage III       Past Surgical History:   Procedure Laterality Date   • CHOLECYSTECTOMY     • HYSTERECTOMY     • US GUIDED KIDNEY BIOPSY  6/1/2020       History reviewed. No pertinent family history.     Social History     Tobacco Use   • Smoking status: Every Day   • Smokeless tobacco: Current   Substance Use Topics   • Alcohol use: Not Currently   • Drug use: Never       Review of Systems    Physical Exam     ED Triage Vitals [08/11/23 1644]   Temperature Pulse Respirations Blood Pressure SpO2   98.1 °F (36.7 °C) 83 20 140/67 99 %      Temp Source Heart Rate Source Patient Position - Orthostatic VS BP Location FiO2 (%)   Oral Monitor Sitting Right arm --      Pain Score       10 - Worst Possible Pain           Physical Exam    ED Course

## 2025-03-07 ENCOUNTER — TELEPHONE (OUTPATIENT)
Age: 50
End: 2025-03-07

## 2025-03-07 NOTE — TELEPHONE ENCOUNTER
Kelly nurse from [facility] called stated pt was referred to GI and has not gotten a call to schedule for chronic diarrhea. I called the patient  to schedule the appt and left a message to call us back

## 2025-03-20 ENCOUNTER — RA CDI HCC (OUTPATIENT)
Dept: OTHER | Facility: HOSPITAL | Age: 50
End: 2025-03-20

## 2025-03-20 NOTE — PROGRESS NOTES
HCC coding opportunities    E11.22, E11.65     Chart Reviewed number of suggestions sent to Provider: 2     Patients Insurance     Medicare Insurance: [employer] Advantage